# Patient Record
Sex: MALE | Race: WHITE | URBAN - METROPOLITAN AREA
[De-identification: names, ages, dates, MRNs, and addresses within clinical notes are randomized per-mention and may not be internally consistent; named-entity substitution may affect disease eponyms.]

---
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
## 2021-12-23 ENCOUNTER — HOSPITAL ENCOUNTER (OUTPATIENT)
Age: 21
Discharge: HOME | End: 2021-12-23
Payer: COMMERCIAL

## 2021-12-23 VITALS
TEMPERATURE: 98 F | HEART RATE: 58 BPM | RESPIRATION RATE: 16 BRPM | SYSTOLIC BLOOD PRESSURE: 143 MMHG | WEIGHT: 220 LBS | DIASTOLIC BLOOD PRESSURE: 99 MMHG | BODY MASS INDEX: 30.8 KG/M2 | HEIGHT: 71 IN | OXYGEN SATURATION: 97 %

## 2021-12-23 DIAGNOSIS — J02.0 STREPTOCOCCAL SORE THROAT: Primary | ICD-10-CM

## 2021-12-23 PROCEDURE — 99203 OFFICE O/P NEW LOW 30 MIN: CPT | Performed by: NURSE PRACTITIONER

## 2021-12-23 PROCEDURE — 87880 STREP A ASSAY W/OPTIC: CPT | Performed by: NURSE PRACTITIONER

## 2021-12-23 RX ORDER — AMOXICILLIN 500 MG/1
500 TABLET, FILM COATED ORAL 3 TIMES DAILY
Qty: 30 TABLET | Refills: 0 | Status: SHIPPED | OUTPATIENT
Start: 2021-12-23 | End: 2022-01-02

## 2021-12-23 NOTE — ED PROVIDER NOTES
Patient Seen in: Immediate [address] [place] [address]      History   Patient presents with:  Sore Throat    Stated Complaint: sore throat    Subjective: This is a 32-year-old male with no significant past medical history.   Presents to immediate care for sor °C)   Temp src Temporal   SpO2 97 %   O2 Device None (Room air)       Current:BP (!) 143/99   Pulse 58   Temp 97.8 °F (36.6 °C) (Temporal)   Resp 16   Ht 180.3 cm (5' 11\")   Wt 99.8 kg   SpO2 97%   BMI 30.68 kg/m²         Physical Exam  Vitals and nursing than 2 seconds. Findings: No rash. Neurological:      Mental Status: He is alert and oriented to person, place, and time.    Psychiatric:         Mood and Affect: Mood normal.         Behavior: Behavior normal.               ED Course     Labs Review

## 2021-12-23 NOTE — ED INITIAL ASSESSMENT (HPI)
Patient reports ongoing issues with swelling to tonsils and possible stones and exudate. Denies fever.

## 2022-01-05 PROBLEM — F41.9 ANXIETY DISORDER: Status: ACTIVE | Noted: 2022-01-05

## 2022-01-05 PROBLEM — F33.42 RECURRENT MAJOR DEPRESSIVE DISORDER, IN FULL REMISSION (HCC): Status: ACTIVE | Noted: 2022-01-05

## 2025-01-13 ENCOUNTER — APPOINTMENT (OUTPATIENT)
Dept: OTHER | Facility: HOSPITAL | Age: 25
End: 2025-01-13
Attending: PREVENTIVE MEDICINE

## (undated) NOTE — ED AVS SNAPSHOT
Crys Dianelys   MRN: [id number]    Department:  [facility] Emergency Department   Date of Visit:  2/5/2018           Disclosure     Insurance plans vary and the physician(s) referred by the ER may not be covered by your plan.  Please contact your i tell this physician (or your personal doctor if your instructions are to return to your personal doctor) about any new or lasting problems. The primary care or specialist physician will see patients referred from the [facility] Emergency Department.  Mary Philippe

## (undated) NOTE — LETTER
February 5, 2018    Patient: José Manuel Blevins   Date of Visit: 2/5/2018       To Whom It May Concern:    Yanick Cortes was seen and treated in our emergency department on 2/5/2018. He should not participate in gym/sports until 1 week and symptom free.     If